# Patient Record
Sex: FEMALE | Race: WHITE
[De-identification: names, ages, dates, MRNs, and addresses within clinical notes are randomized per-mention and may not be internally consistent; named-entity substitution may affect disease eponyms.]

---

## 2018-07-07 ENCOUNTER — HOSPITAL ENCOUNTER (EMERGENCY)
Dept: HOSPITAL 25 - UCCORT | Age: 33
Discharge: HOME | End: 2018-07-07
Payer: COMMERCIAL

## 2018-07-07 DIAGNOSIS — H65.93: Primary | ICD-10-CM

## 2018-07-07 PROCEDURE — 99201: CPT

## 2018-07-07 PROCEDURE — G0463 HOSPITAL OUTPT CLINIC VISIT: HCPCS

## 2018-07-07 NOTE — UC
Ear Complaint HPI





- HPI Summary


HPI Summary: 


Per RN triage "Right ear plugged feeling since 18. Starting to have left 

ear pain as well. Started out as cough, congestion which has improved while 

ears have not. Taking leftover augmentin 500-125mg PO BID x5 days without 

relief. "


Denies fevers and chills.  Mild sinus pain and pressure.  Minimal symptoms on 

last.  No wheezing.  Has not taken any Flonase or other nasal sprays or 

antihistamines.  No known history of allergies.  Symptoms started after she 

went hiking initially.  She was getting allergies.








- History of Current Complaint


Chief Complaint: UCEar


Stated Complaint: RIGHT EAR


Time Seen by Provider: 18 08:30


Hx Last Menstrual Period: 18


Pain Intensity: 7





- Allergies/Home Medications


Allergies/Adverse Reactions: 


 Allergies











Allergy/AdvReac Type Severity Reaction Status Date / Time


 


No Known Allergies Allergy   Verified 18 08:35











Home Medications: 


 Home Medications





Ibuprofen TAB* [Motrin TAB* 800 MG] 800 mg PO ONCE PRN 18 [History 

Confirmed 18]


guaiFENesin ER TAB [Mucinex*] 600 mg PO BID 18 [History Confirmed 18

]











PMH/Surg Hx/FS Hx/Imm Hx


Previously Healthy: Yes





- Surgical History


Surgery Procedure, Year, and Place:  x2.  R ACL repair





- Family History


Known Family History: Positive: Hypertension





- Social History


Alcohol Use: Occasionally


Substance Use Type: None


Smoking Status (MU): Never Smoked Tobacco





Review of Systems


Constitutional: Negative


Skin: Negative


Eyes: Negative


ENT: Negative, Ear Ache, Nasal Discharge


Respiratory: Cough


Cardiovascular: Negative


Gastrointestinal: Negative


Genitourinary: Negative


Motor: Negative


Neurovascular: Negative


Musculoskeletal: Negative


Neurological: Negative


Psychological: Negative


Is Patient Immunocompromised?: No


All Other Systems Reviewed And Are Negative: Yes





Physical Exam


Triage Information Reviewed: Yes


Appearance: Well-Appearing, No Pain Distress, Well-Nourished


Vital Signs: 


 Initial Vital Signs











Temp  97.9 F   18 08:29


 


Pulse  76   18 08:29


 


Resp  18   18 08:29


 


BP  126/81   18 08:29


 


Pulse Ox  100   18 08:29











Eye Exam: Normal


ENT Exam: Normal


ENT: Positive: Pharynx normal, Nasal congestion, Other - B/L TMs w/ serous 

fluid Rt > Lt. intact. rt fullness. no tenderness to external ear, canals nml. 

no cerumen..  Negative: TM bulging, TM dull, TM red, Tonsillar swelling, 

Tonsillar exudate, Muffled voice, Sinus tenderness


Dental Exam: Normal


Neck exam: Normal


Neck: Positive: Supple, Nontender, No Lymphadenopathy


Respiratory Exam: Normal


Respiratory: Positive: Lungs clear, Normal breath sounds, No respiratory 

distress, No accessory muscle use.  Negative: Crackles, Rhonchi, Stridor, 

Wheezing


Cardiovascular Exam: Normal


Cardiovascular: Positive: RRR, No Murmur


Abdomen Description: Positive: Nontender, Soft


Musculoskeletal Exam: Normal


Neurological Exam: Normal


Psychological Exam: Normal


Skin Exam: Normal





Ear Complaint Course/Dx





- Course


Course Of Treatment: stop augmentin. no e/o bacterial infection. she should not 

take meds that are not prescribed to her.





- Differential Dx/Diagnosis


Differential Diagnosis/HQI/PQRI: Cerumen Impaction, Otitis Externa, Otitis Media

, URI


Provider Diagnoses: B/L serous otitis





Discharge





- Sign-Out/Discharge


Documenting (check all that apply): Discharge/Admit/Transfer





- Discharge Plan


Condition: Stable


Disposition: HOME


Patient Education Materials:  Serous Otitis Media (ED)


Referrals: 


Brandon Newton NP [Primary Care Provider] - 


Additional Instructions: 


-You should use OTC flonase nasal spray per label instructions and cetirizine (

generic zyrtec) 10mgs daily. There is no bacterial infection and you should 

stop the antibiotic. 





- Billing Disposition and Condition


Condition: STABLE


Disposition: Home